# Patient Record
(demographics unavailable — no encounter records)

---

## 2025-05-29 NOTE — PHYSICAL EXAM
[Urethral Meatus] : meatus normal [Penis Abnormality] : normal circumcised penis [Urinary Bladder Findings] : the bladder was normal on palpation [Scrotum] : the scrotum was normal [Testes Mass (___cm)] : there were no testicular masses [No Prostate Nodules] : no prostate nodules

## 2025-05-29 NOTE — ASSESSMENT
[FreeTextEntry1] : Diagnosis:  Frequency  Abdominal pain  Plan The frequency of urination is mild and empties well.  Observe.  Send UA Abdominal pain is resolved - observe unless recurs    Grabiel Pearson MD, FACS, FRCS  of Urology [facility] [place] Director of Laparoscopic and Robotic Surgery [facility] Director of Urology, [facility] Professor of Urology   (Office) [phone] (Cell)  [phone] [email or web address]     I performed history/review of imaging, discussed treatment plan with patient, agree with above transcription by LISA.

## 2025-05-29 NOTE — HISTORY OF PRESENT ILLNESS
[FreeTextEntry1] : Dear  Self referred   Thank you so much for the referral to help care for your patient.  Chief Complaint: frequent urination and abdominal pain  Date of first visit: 05/29/2025   VIOLA GUY   is a 37-year-old male who presents for frequent urination. 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 3. Mild splenomegaly.